# Patient Record
Sex: FEMALE | Race: WHITE | NOT HISPANIC OR LATINO | URBAN - METROPOLITAN AREA
[De-identification: names, ages, dates, MRNs, and addresses within clinical notes are randomized per-mention and may not be internally consistent; named-entity substitution may affect disease eponyms.]

---

## 2017-01-18 ENCOUNTER — APPOINTMENT (RX ONLY)
Dept: URBAN - METROPOLITAN AREA CLINIC 171 | Facility: CLINIC | Age: 72
Setting detail: DERMATOLOGY
End: 2017-01-18

## 2017-01-18 DIAGNOSIS — L82.0 INFLAMED SEBORRHEIC KERATOSIS: ICD-10-CM

## 2017-01-18 DIAGNOSIS — L82.1 OTHER SEBORRHEIC KERATOSIS: ICD-10-CM

## 2017-01-18 PROBLEM — L40.0 PSORIASIS VULGARIS: Status: ACTIVE | Noted: 2017-01-18

## 2017-01-18 PROCEDURE — 99213 OFFICE O/P EST LOW 20 MIN: CPT | Mod: 25

## 2017-01-18 PROCEDURE — ? LIQUID NITROGEN

## 2017-01-18 PROCEDURE — 17110 DESTRUCTION B9 LES UP TO 14: CPT

## 2017-01-18 ASSESSMENT — LOCATION ZONE DERM
LOCATION ZONE: TRUNK
LOCATION ZONE: FACE

## 2017-01-18 ASSESSMENT — LOCATION DETAILED DESCRIPTION DERM
LOCATION DETAILED: LEFT LATERAL SUPERIOR CHEST
LOCATION DETAILED: RIGHT MEDIAL EYEBROW
LOCATION DETAILED: LEFT MEDIAL EYEBROW
LOCATION DETAILED: LEFT MEDIAL SUPERIOR CHEST
LOCATION DETAILED: LEFT SUPERIOR CENTRAL MALAR CHEEK
LOCATION DETAILED: RIGHT MEDIAL SUPERIOR CHEST
LOCATION DETAILED: LEFT MEDIAL MALAR CHEEK

## 2017-01-18 ASSESSMENT — LOCATION SIMPLE DESCRIPTION DERM
LOCATION SIMPLE: RIGHT EYEBROW
LOCATION SIMPLE: LEFT CHEEK
LOCATION SIMPLE: LEFT EYEBROW
LOCATION SIMPLE: CHEST

## 2017-01-18 NOTE — PROCEDURE: LIQUID NITROGEN
Post-Care Instructions: I reviewed with the patient in detail post-care instructions. Patient is to wear sunprotection, and avoid picking at any of the treated lesions. Pt may apply Vaseline to crusted or scabbing areas.
Include Z78.9 (Other Specified Conditions Influencing Health Status) As An Associated Diagnosis?: No
Duration Of Freeze Thaw-Cycle (Seconds): 0
Medical Necessity Clause: This procedure was medically necessary because the lesions that were treated were:
Detail Level: Detailed
Medical Necessity Information: It is in your best interest to select a reason for this procedure from the list below. All of these items fulfill various CMS LCD requirements except the new and changing color options.
Consent: The patient's consent was obtained including but not limited to risks of crusting, scabbing, blistering, scarring, darker or lighter pigmentary change, recurrence, incomplete removal and infection.

## 2017-08-30 ENCOUNTER — APPOINTMENT (RX ONLY)
Dept: URBAN - METROPOLITAN AREA CLINIC 171 | Facility: CLINIC | Age: 72
Setting detail: DERMATOLOGY
End: 2017-08-30

## 2017-08-30 DIAGNOSIS — L68.0 HIRSUTISM: ICD-10-CM

## 2017-08-30 DIAGNOSIS — L70.0 ACNE VULGARIS: ICD-10-CM

## 2017-08-30 PROBLEM — E03.9 HYPOTHYROIDISM, UNSPECIFIED: Status: ACTIVE | Noted: 2017-08-30

## 2017-08-30 PROBLEM — F32.9 MAJOR DEPRESSIVE DISORDER, SINGLE EPISODE, UNSPECIFIED: Status: ACTIVE | Noted: 2017-08-30

## 2017-08-30 PROBLEM — Z85.118 PERSONAL HISTORY OF OTHER MALIGNANT NEOPLASM OF BRONCHUS AND LUNG: Status: ACTIVE | Noted: 2017-08-30

## 2017-08-30 PROCEDURE — ? TREATMENT REGIMEN

## 2017-08-30 PROCEDURE — 99212 OFFICE O/P EST SF 10 MIN: CPT

## 2017-08-30 PROCEDURE — ? PRESCRIPTION

## 2017-08-30 PROCEDURE — ? COUNSELING

## 2017-08-30 RX ORDER — EFLORNITHINE HYDROCHLORIDE 139 MG/G
APPLY CREAM TOPICAL
Qty: 1 | Refills: 3 | Status: ERX | COMMUNITY
Start: 2017-08-30

## 2017-08-30 RX ADMIN — EFLORNITHINE HYDROCHLORIDE APPLY: 139 CREAM TOPICAL at 17:18

## 2017-08-30 ASSESSMENT — LOCATION DETAILED DESCRIPTION DERM
LOCATION DETAILED: LEFT SUPERIOR MEDIAL BUCCAL CHEEK
LOCATION DETAILED: LEFT CHIN
LOCATION DETAILED: RIGHT NASOLABIAL FOLD

## 2017-08-30 ASSESSMENT — LOCATION SIMPLE DESCRIPTION DERM
LOCATION SIMPLE: LEFT CHEEK
LOCATION SIMPLE: RIGHT LIP
LOCATION SIMPLE: CHIN

## 2017-08-30 ASSESSMENT — LOCATION ZONE DERM
LOCATION ZONE: LIP
LOCATION ZONE: FACE

## 2017-08-30 NOTE — PROCEDURE: TREATMENT REGIMEN
Samples Given: Onexton
Initiate Treatment: Onexton once daily to spot treat
Plan: Call for prescription if patient likes the medication
Detail Level: Zone

## 2017-10-16 RX ORDER — EFLORNITHINE HYDROCHLORIDE 139 MG/G
1 CREAM TOPICAL DAILY
Qty: 1 | Refills: 3 | Status: ERX

## 2018-06-21 ENCOUNTER — OFFICE VISIT (OUTPATIENT)
Dept: URBAN - METROPOLITAN AREA CLINIC 45 | Facility: CLINIC | Age: 73
End: 2018-06-21
Payer: MEDICARE

## 2018-06-21 DIAGNOSIS — H25.13 AGE-RELATED NUCLEAR CATARACT, BILATERAL: ICD-10-CM

## 2018-06-21 DIAGNOSIS — H53.19 OTHER SUBJECTIVE VISUAL DISTURBANCES: ICD-10-CM

## 2018-06-21 DIAGNOSIS — H04.123 DRY EYE SYNDROME OF BILATERAL LACRIMAL GLANDS: Primary | ICD-10-CM

## 2018-06-21 PROCEDURE — 92004 COMPRE OPH EXAM NEW PT 1/>: CPT | Performed by: OPTOMETRIST

## 2018-06-21 PROCEDURE — 92014 COMPRE OPH EXAM EST PT 1/>: CPT | Performed by: OPTOMETRIST

## 2018-06-21 ASSESSMENT — INTRAOCULAR PRESSURE
OS: 15
OD: 15

## 2018-06-21 NOTE — IMPRESSION/PLAN
Impression: Age-related nuclear cataract, bilateral: H25.13 OU. Plan: Will continue to observe condition and or symptoms.

## 2018-06-21 NOTE — IMPRESSION/PLAN
Impression: Other subjective visual disturbances: H53.19.  Plan: rtc for vf 30-2 , pt has a history of brain tumor

## 2018-08-06 ENCOUNTER — OFFICE VISIT (OUTPATIENT)
Dept: URBAN - METROPOLITAN AREA CLINIC 45 | Facility: CLINIC | Age: 73
End: 2018-08-06
Payer: MEDICARE

## 2018-08-06 PROCEDURE — 92014 COMPRE OPH EXAM EST PT 1/>: CPT | Performed by: OPTOMETRIST

## 2018-08-06 PROCEDURE — 92083 EXTENDED VISUAL FIELD XM: CPT | Performed by: OPTOMETRIST

## 2018-08-06 ASSESSMENT — INTRAOCULAR PRESSURE
OS: 12
OD: 14

## 2018-08-06 NOTE — IMPRESSION/PLAN
Impression: Other subjective visual disturbances: H53.19.  Plan: sp tumor removal, right inferior quadranopsia

## 2018-08-14 ENCOUNTER — OFFICE VISIT (OUTPATIENT)
Dept: URBAN - METROPOLITAN AREA CLINIC 45 | Facility: CLINIC | Age: 73
End: 2018-08-14
Payer: MEDICARE

## 2018-08-14 ASSESSMENT — VISUAL ACUITY
OD: 20/30
OS: 20/30

## 2019-01-01 ENCOUNTER — OFFICE VISIT (OUTPATIENT)
Dept: URBAN - METROPOLITAN AREA CLINIC 45 | Facility: CLINIC | Age: 74
End: 2019-01-01
Payer: MEDICARE

## 2019-01-01 ENCOUNTER — OFFICE VISIT (OUTPATIENT)
Dept: URBAN - METROPOLITAN AREA CLINIC 11 | Facility: CLINIC | Age: 74
End: 2019-01-01
Payer: MEDICARE

## 2019-01-01 DIAGNOSIS — H52.4 PRESBYOPIA: ICD-10-CM

## 2019-01-01 PROCEDURE — 92012 INTRM OPH EXAM EST PATIENT: CPT | Performed by: OPTOMETRIST

## 2019-01-01 PROCEDURE — 92014 COMPRE OPH EXAM EST PT 1/>: CPT | Performed by: OPHTHALMOLOGY

## 2019-01-01 ASSESSMENT — KERATOMETRY
OD: 43.63
OS: 43.38
OS: 43.00
OD: 44.13

## 2019-01-01 ASSESSMENT — INTRAOCULAR PRESSURE
OS: 15
OD: 18
OS: 18
OD: 17

## 2019-01-01 ASSESSMENT — VISUAL ACUITY
OD: 20/40
OS: 20/40

## 2019-05-07 NOTE — IMPRESSION/PLAN
Impression: Age-related nuclear cataract, bilateral: H25.13 OU. Plan: Discussed diagnosis with patient. Do not recommend any surgical treatment at this time. [id number] Christine Spaulding for patient to update Srx with Dr Ciaran Dahl. Patient will monitor for any vision changes.

## 2019-05-07 NOTE — IMPRESSION/PLAN
Impression: Other subjective visual disturbances: H53.19. Plan: Discussed diagnosis with patient. Patient just completed radiation for brain CA. Follow up MRI in 3 months per . Recommend [facility] 30-2 and follow up to compare.

## 2019-06-17 NOTE — IMPRESSION/PLAN
Impression: Other subjective visual disturbances: H53.19. Plan: attempted VF today ou but pt unable to complete. pt just completed radiation for brain CA. Monitor.  f/u in 6mns